# Patient Record
Sex: MALE | Employment: FULL TIME | URBAN - METROPOLITAN AREA
[De-identification: names, ages, dates, MRNs, and addresses within clinical notes are randomized per-mention and may not be internally consistent; named-entity substitution may affect disease eponyms.]

---

## 2024-05-08 ENCOUNTER — OFFICE VISIT (OUTPATIENT)
Dept: VASCULAR SURGERY | Age: 64
End: 2024-05-08
Payer: COMMERCIAL

## 2024-05-08 VITALS — WEIGHT: 194 LBS

## 2024-05-08 DIAGNOSIS — I73.9 CLAUDICATION OF LEFT LOWER EXTREMITY (HCC): Primary | ICD-10-CM

## 2024-05-08 PROCEDURE — 99203 OFFICE O/P NEW LOW 30 MIN: CPT | Performed by: SURGERY

## 2024-05-08 PROCEDURE — 1123F ACP DISCUSS/DSCN MKR DOCD: CPT | Performed by: SURGERY

## 2024-05-08 RX ORDER — POTASSIUM CHLORIDE 750 MG/1
10 CAPSULE, EXTENDED RELEASE ORAL 2 TIMES DAILY
COMMUNITY

## 2024-05-08 RX ORDER — AMLODIPINE BESYLATE 5 MG/1
5 TABLET ORAL DAILY
COMMUNITY

## 2024-05-08 RX ORDER — OMEGA-3 FATTY ACIDS/FISH OIL 300-1000MG
CAPSULE ORAL
COMMUNITY

## 2024-05-08 RX ORDER — CILOSTAZOL 50 MG/1
50 TABLET ORAL 2 TIMES DAILY
Qty: 60 TABLET | Refills: 3 | Status: SHIPPED | OUTPATIENT
Start: 2024-05-08

## 2024-05-08 RX ORDER — ASPIRIN 81 MG/1
81 TABLET ORAL DAILY
COMMUNITY

## 2024-05-08 RX ORDER — ROSUVASTATIN CALCIUM 10 MG/1
10 TABLET, COATED ORAL DAILY
COMMUNITY

## 2024-05-08 RX ORDER — ASCORBIC ACID 500 MG
500 TABLET ORAL DAILY
COMMUNITY

## 2024-05-08 RX ORDER — M-VIT,TX,IRON,MINS/CALC/FOLIC 27MG-0.4MG
1 TABLET ORAL DAILY
COMMUNITY

## 2024-05-08 RX ORDER — CHOLECALCIFEROL (VITAMIN D3) 1250 MCG
CAPSULE ORAL
COMMUNITY

## 2024-05-08 RX ORDER — LISINOPRIL 20 MG/1
20 TABLET ORAL DAILY
COMMUNITY

## 2024-05-08 NOTE — PROGRESS NOTES
Vascular Surgery Outpatient Consultation      Chief Complaint   Patient presents with    Consultation     New pt. Left lower extremity cramping and left foot tingling       Reason for Consult:  LLE claudication    Requesting Physician:  Dr. Freitas    HISTORY OF PRESENT ILLNESS:                The patient is a 64 y.o. male who presents for evaluation of left lower extremity claudication.  He reports this has been going on for 2 years.  He can go about 50 yards before he has cramping in his left lower leg.  Denies any symptoms on his right.  He is a current 1 pack/day smoker.  He is on an aspirin and statin.    Past Medical History:        Diagnosis Date    HTN (hypertension)     Hyperlipidemia     PVD (peripheral vascular disease) (HCC)      Past Surgical History:        Procedure Laterality Date    ARM SURGERY       Current Medications:   Prior to Admission medications    Medication Sig Start Date End Date Taking? Authorizing Provider   rosuvastatin (CRESTOR) 10 MG tablet Take 1 tablet by mouth daily   Yes Parish Bailon MD   amLODIPine (NORVASC) 5 MG tablet Take 1 tablet by mouth daily   Yes Parish Bailon MD   Omega 3 1000 MG CAPS Take by mouth   Yes Parish Bailon MD   Multiple Vitamins-Minerals (THERAPEUTIC MULTIVITAMIN-MINERALS) tablet Take 1 tablet by mouth daily   Yes Parish Bailon MD   potassium chloride (MICRO-K) 10 MEQ extended release capsule Take 1 capsule by mouth 2 times daily   Yes Parish Bailon MD   Cholecalciferol (VITAMIN D3) 1.25 MG (34973 UT) CAPS Take by mouth   Yes Parish Bailon MD   vitamin C (ASCORBIC ACID) 500 MG tablet Take 1 tablet by mouth daily   Yes Parish Bailon MD   aspirin 81 MG EC tablet Take 1 tablet by mouth daily   Yes Parish Bialon MD   lisinopril (PRINIVIL;ZESTRIL) 20 MG tablet Take 1 tablet by mouth daily   Yes Parish Bailon MD     Allergies:  Patient has no known allergies.    Social History

## 2025-07-16 ENCOUNTER — TRANSCRIBE ORDERS (OUTPATIENT)
Dept: ADMINISTRATIVE | Age: 65
End: 2025-07-16

## 2025-07-16 DIAGNOSIS — Z12.2 SCREENING FOR MALIGNANT NEOPLASM OF RESPIRATORY ORGAN: Primary | ICD-10-CM

## 2025-07-16 DIAGNOSIS — F17.210 CIGARETTE SMOKER: ICD-10-CM
